# Patient Record
Sex: MALE | Race: WHITE | NOT HISPANIC OR LATINO | URBAN - METROPOLITAN AREA
[De-identification: names, ages, dates, MRNs, and addresses within clinical notes are randomized per-mention and may not be internally consistent; named-entity substitution may affect disease eponyms.]

---

## 2020-02-25 ENCOUNTER — OUTPATIENT (OUTPATIENT)
Dept: OUTPATIENT SERVICES | Facility: HOSPITAL | Age: 80
LOS: 1 days | End: 2020-02-25
Payer: MEDICARE

## 2020-02-25 DIAGNOSIS — R07.9 CHEST PAIN, UNSPECIFIED: ICD-10-CM

## 2020-02-25 PROCEDURE — 82962 GLUCOSE BLOOD TEST: CPT

## 2020-02-25 PROCEDURE — 93017 CV STRESS TEST TRACING ONLY: CPT

## 2020-02-25 PROCEDURE — 78452 HT MUSCLE IMAGE SPECT MULT: CPT

## 2020-02-25 PROCEDURE — 93018 CV STRESS TEST I&R ONLY: CPT

## 2020-02-25 PROCEDURE — A9500: CPT

## 2020-02-25 PROCEDURE — 78452 HT MUSCLE IMAGE SPECT MULT: CPT | Mod: 26

## 2020-02-25 PROCEDURE — 93016 CV STRESS TEST SUPVJ ONLY: CPT

## 2021-03-04 ENCOUNTER — APPOINTMENT (OUTPATIENT)
Dept: PULMONOLOGY | Facility: CLINIC | Age: 81
End: 2021-03-04
Payer: MEDICARE

## 2021-03-04 VITALS
SYSTOLIC BLOOD PRESSURE: 151 MMHG | TEMPERATURE: 96.2 F | WEIGHT: 168 LBS | HEIGHT: 72 IN | BODY MASS INDEX: 22.75 KG/M2 | OXYGEN SATURATION: 94 % | HEART RATE: 64 BPM | DIASTOLIC BLOOD PRESSURE: 91 MMHG

## 2021-03-04 DIAGNOSIS — Z87.891 PERSONAL HISTORY OF NICOTINE DEPENDENCE: ICD-10-CM

## 2021-03-04 DIAGNOSIS — Z78.9 OTHER SPECIFIED HEALTH STATUS: ICD-10-CM

## 2021-03-04 DIAGNOSIS — Z86.39 PERSONAL HISTORY OF OTHER ENDOCRINE, NUTRITIONAL AND METABOLIC DISEASE: ICD-10-CM

## 2021-03-04 PROBLEM — Z00.00 ENCOUNTER FOR PREVENTIVE HEALTH EXAMINATION: Status: ACTIVE | Noted: 2021-03-04

## 2021-03-04 PROCEDURE — 99204 OFFICE O/P NEW MOD 45 MIN: CPT

## 2021-03-04 RX ORDER — METFORMIN HYDROCHLORIDE 1000 MG/1
1000 TABLET, EXTENDED RELEASE ORAL
Refills: 0 | Status: ACTIVE | COMMUNITY

## 2021-03-04 RX ORDER — DULOXETINE HYDROCHLORIDE 20 MG/1
20 CAPSULE, DELAYED RELEASE ORAL
Refills: 0 | Status: ACTIVE | COMMUNITY

## 2021-03-04 RX ORDER — ATORVASTATIN CALCIUM 20 MG/1
20 TABLET, FILM COATED ORAL
Refills: 0 | Status: ACTIVE | COMMUNITY

## 2021-03-04 NOTE — PHYSICAL EXAM
[No Acute Distress] : no acute distress [Well Nourished] : well nourished [Normal Oropharynx] : normal oropharynx [II] : Mallampati Class: II [Normal Appearance] : normal appearance [No JVD] : no jvd [Normal Rate/Rhythm] : normal rate/rhythm [Normal S1, S2] : normal s1, s2 [No Resp Distress] : no resp distress [Clear to Auscultation Bilaterally] : clear to auscultation bilaterally [Benign] : benign [Not Tender] : not tender [Normal Gait] : normal gait [Gait - Sufficient For Exercise Testing] : gait sufficient for exercise testing [No Clubbing] : no clubbing [No Edema] : no edema [Normal Color/ Pigmentation] : normal color/ pigmentation [No Rash] : no rash [No Focal Deficits] : no focal deficits [No Sensory Deficits] : no sensory deficits [Oriented x3] : oriented x3 [Normal Affect] : normal affect

## 2021-03-04 NOTE — HISTORY OF PRESENT ILLNESS
[TextBox_4] : 80 year old male , ex-smoker (quit smoking 20 years back) with h/o chronic back surgery (planned for back surgery) was referred to Pulmonary clinic by Dr. Small for abnormal CT chest before Preop clearance before back surgery.\par Patient is c/o cough for last 3 weeks. Cough is associated with occasional phlegm. Patient is denying fever, chest pain or shortness of breath. CXR was done which showed opacity on right side. CT chest was done which showed RUL posterior segment consolidation. Patient denies coughing while eating.

## 2021-03-04 NOTE — DISCUSSION/SUMMARY
[FreeTextEntry1] : 80 year old male , ex-smoker (quit smoking 20 years back) with h/o chronic back surgery (planned for back surgery) was referred to Pulmonary clinic by Dr. Small for abnormal CT chest before Preop clearance before back surgery.\par \par Review:\par PCP note from Nov 2020\par CT chest (3/21): RUL posterior segment consolidation\par \par A/P\par Patient is c/o mild, predominantly dry cough for last 3 weeks but no other pulmonary symptoms. Plan to treat for aspiration pneumonia for 7 days. Barium swallow evaluation. Follow up in 1 week. Patient will bring CD of the CT chest during next visit. We will call PCP office for CBC report from yesterday. Plan for repeat CT chest in 8 weeks.

## 2021-03-04 NOTE — CONSULT LETTER
[Dear  ___] : Dear  [unfilled], [Consult Letter:] : I had the pleasure of evaluating your patient, [unfilled]. [Please see my note below.] : Please see my note below. [Consult Closing:] : Thank you very much for allowing me to participate in the care of this patient.  If you have any questions, please do not hesitate to contact me. [FreeTextEntry3] : Sincerely\par \par Tyron Cortez MD [facility]\par , [employer]\par Associate , Pulmonary and Critical Care Fellowship\par Pulmonary and Critical Care\par [facility]\par Phone: [phone]\par

## 2021-03-04 NOTE — REVIEW OF SYSTEMS
[Fever] : no fever [Chills] : no chills [Dry Eyes] : no dry eyes [Cough] : cough [Sputum] : no sputum [Dyspnea] : no dyspnea [SOB on Exertion] : no sob on exertion [Chest Discomfort] : no chest discomfort [Orthopnea] : no orthopnea [Nasal Discharge] : no nasal discharge [TextBox_148] : rest of ROS negative except in HPI

## 2021-03-12 ENCOUNTER — APPOINTMENT (OUTPATIENT)
Dept: PULMONOLOGY | Facility: CLINIC | Age: 81
End: 2021-03-12
Payer: MEDICARE

## 2021-03-12 ENCOUNTER — RESULT REVIEW (OUTPATIENT)
Age: 81
End: 2021-03-12

## 2021-03-12 VITALS
TEMPERATURE: 97.3 F | RESPIRATION RATE: 12 BRPM | HEART RATE: 70 BPM | BODY MASS INDEX: 22.35 KG/M2 | DIASTOLIC BLOOD PRESSURE: 80 MMHG | WEIGHT: 165 LBS | SYSTOLIC BLOOD PRESSURE: 145 MMHG | OXYGEN SATURATION: 96 % | HEIGHT: 72 IN

## 2021-03-12 DIAGNOSIS — K21.9 GASTRO-ESOPHAGEAL REFLUX DISEASE W/OUT ESOPHAGITIS: ICD-10-CM

## 2021-03-12 DIAGNOSIS — Z01.811 ENCOUNTER FOR PREPROCEDURAL RESPIRATORY EXAMINATION: ICD-10-CM

## 2021-03-12 PROCEDURE — 99214 OFFICE O/P EST MOD 30 MIN: CPT

## 2021-03-12 RX ORDER — PANTOPRAZOLE 40 MG/1
40 TABLET, DELAYED RELEASE ORAL DAILY
Qty: 45 | Refills: 1 | Status: ACTIVE | COMMUNITY
Start: 2021-03-12 | End: 1900-01-01

## 2021-03-12 RX ORDER — AMOXICILLIN AND CLAVULANATE POTASSIUM 875; 125 MG/1; MG/1
875-125 TABLET, COATED ORAL TWICE DAILY
Qty: 14 | Refills: 0 | Status: DISCONTINUED | COMMUNITY
Start: 2021-03-04 | End: 2021-03-12

## 2021-03-12 NOTE — CONSULT LETTER
[Dear  ___] : Dear  [unfilled], [Consult Letter:] : I had the pleasure of evaluating your patient, [unfilled]. [Please see my note below.] : Please see my note below. [Consult Closing:] : Thank you very much for allowing me to participate in the care of this patient.  If you have any questions, please do not hesitate to contact me. [FreeTextEntry3] : Sincerely\par \par [facility]\par , [employer] [facility] [employer]\par Associate , Pulmonary and Critical Care Fellowship\par Pulmonary and Critical Care\par [facility]\par Phone: [phone]\par  [DrAram  ___] : Dr. AHN

## 2021-03-12 NOTE — DISCUSSION/SUMMARY
[FreeTextEntry1] : 80 year old male , ex-smoker (quit smoking 20 years back) with h/o chronic back surgery (planned for back surgery) was referred to Pulmonary clinic by Dr. Small for abnormal CT chest before Preop clearance before back surgery.\par \par Review:\par PCP note from Nov 2020\par CT chest (3/21): RUL posterior segment consolidation (images reviewed), dilated esophagus. \par Labs (from [facility]): WBC normal\par \par A/P\par (1) Preoperative clearance:\par Patient has good functional status. His mild cough is possibly due to GERD. RUL consolidation is most likely secondary to chronic aspiration (dilated esophagus). Treated for 1 week with antibiotic. No active infection at present.\par \par Patient may undergo back surgery. His ARISCAT score 35. She has intermediate risk (13.3%) of in-hospital post-op pulmonary complications (composite including respiratory failure, respiratory infection, pleural effusion, atelectasis, pneumothorax, bronchospasm, aspiration pneumonitis).\par \par (2) RUL consolidation:\par - S/p 1 week Augmentin\par - Esophagus dilated on CT chest. possible aspiration.\par Plan:\par - PPI for GERD\par - CT chest in 6-8 weeks \par - Barium swallow for aspiration.\par - Follow up after repeat CT chest

## 2021-03-12 NOTE — HISTORY OF PRESENT ILLNESS
[TextBox_4] : 80 year old male , ex-smoker (quit smoking 20 years back) with h/o chronic back surgery (planned for back surgery) was referred to Pulmonary clinic by Dr. Small for abnormal CT chest before Preop clearance before back surgery.\par Patient is c/o cough for last 3 weeks. Cough is associated with occasional phlegm. Patient is denying fever, chest pain or shortness of breath. CXR was done which showed opacity on right side. CT chest was done which showed RUL posterior segment consolidation. Patient denies coughing while eating. \par \par 3/12/21:\par Patient finished 1 week of antibiotic. Mild cough is present. Denying fever, chest pain, SOB. He brought CD of CT chest done on 3/3/21.\par \par Duy Small MD, Phone: [phone], Cell phone: [phone], Fax [phone]\par Malcolm Castro MD (orthopedic), Phone number: [phone], Fax: [phone]

## 2021-04-21 ENCOUNTER — APPOINTMENT (OUTPATIENT)
Dept: RADIOLOGY | Facility: HOSPITAL | Age: 81
End: 2021-04-21
Payer: MEDICARE

## 2021-04-21 ENCOUNTER — OUTPATIENT (OUTPATIENT)
Dept: OUTPATIENT SERVICES | Facility: HOSPITAL | Age: 81
LOS: 1 days | End: 2021-04-21
Payer: MEDICARE

## 2021-04-21 PROCEDURE — 74230 X-RAY XM SWLNG FUNCJ C+: CPT

## 2021-04-21 PROCEDURE — 92611 MOTION FLUOROSCOPY/SWALLOW: CPT | Mod: GN

## 2021-04-21 PROCEDURE — 74230 X-RAY XM SWLNG FUNCJ C+: CPT | Mod: 26

## 2021-04-26 ENCOUNTER — APPOINTMENT (OUTPATIENT)
Dept: RADIOLOGY | Facility: HOSPITAL | Age: 81
End: 2021-04-26

## 2021-04-26 ENCOUNTER — RESULT REVIEW (OUTPATIENT)
Age: 81
End: 2021-04-26

## 2021-04-26 ENCOUNTER — NON-APPOINTMENT (OUTPATIENT)
Age: 81
End: 2021-04-26

## 2021-04-30 ENCOUNTER — NON-APPOINTMENT (OUTPATIENT)
Age: 81
End: 2021-04-30

## 2021-04-30 ENCOUNTER — APPOINTMENT (OUTPATIENT)
Dept: PULMONOLOGY | Facility: CLINIC | Age: 81
End: 2021-04-30
Payer: MEDICARE

## 2021-04-30 PROCEDURE — 99443: CPT | Mod: 95

## 2021-04-30 NOTE — REVIEW OF SYSTEMS
[Fever] : no fever [Chills] : no chills [Dry Eyes] : no dry eyes [Cough] : no cough [Sputum] : no sputum [Dyspnea] : no dyspnea [SOB on Exertion] : no sob on exertion [Chest Discomfort] : no chest discomfort [Orthopnea] : no orthopnea [Nasal Discharge] : no nasal discharge [TextBox_148] : rest of ROS negative except in HPI

## 2021-04-30 NOTE — DISCUSSION/SUMMARY
[FreeTextEntry1] : 80 year old male , ex-smoker (quit smoking 20 years back) with h/o chronic back surgery (planned for back surgery) was referred to Pulmonary clinic by Dr. Small for abnormal CT chest before Preop clearance before back surgery.\par \par Review:\par PCP note from Nov 2020\par CT chest (3/21): RUL posterior segment consolidation (images reviewed), dilated esophagus. \par Labs (from [facility]): WBC normal\par \par A/P\par RUL consolidation with history of smoking:\par - S/p antibiotic. Clinically, improved.\par - Esophagus dilated on CT chest. Barium swallow: not suggestive of aspiration.\par Plan:\par - Follow up CT chest as RUL consolidation was round/oval in shape/. Hidden lung mass can not be ruled out.  \par - Follow up after repeat CT chest.

## 2021-04-30 NOTE — HISTORY OF PRESENT ILLNESS
[Home] : at home, [unfilled] , at the time of the visit. [Medical Office: ([facility])___] : at the medical office located in  [Verbal consent obtained from patient] : the patient, [unfilled] [TextBox_4] : 80 year old male , ex-smoker (quit smoking 20 years back) with h/o chronic back surgery (planned for back surgery) was referred to Pulmonary clinic by Dr. Small for abnormal CT chest before Preop clearance before back surgery.\par Patient is c/o cough for last 3 weeks. Cough is associated with occasional phlegm. Patient is denying fever, chest pain or shortness of breath. CXR was done which showed opacity on right side. CT chest was done which showed RUL posterior segment consolidation. Patient denies coughing while eating. \par \par 3/12/21:\par Patient finished 1 week of antibiotic. Mild cough is present. Denying fever, chest pain, SOB. He brought CD of CT chest done on 3/3/21.\par \par Duy Small MD, Phone: [phone], Cell phone: [phone], Fax [phone]\par Malcolm Castro MD (orthopedic), Phone number: [phone], Fax: [phone]\par \par 4/30/21:\par Barium swallow test was done. Patient wanted to know about reason to get CT chest again.

## 2021-05-01 ENCOUNTER — RESULT REVIEW (OUTPATIENT)
Age: 81
End: 2021-05-01

## 2021-05-01 ENCOUNTER — OUTPATIENT (OUTPATIENT)
Dept: OUTPATIENT SERVICES | Facility: HOSPITAL | Age: 81
LOS: 1 days | End: 2021-05-01

## 2021-05-01 ENCOUNTER — APPOINTMENT (OUTPATIENT)
Dept: CT IMAGING | Facility: CLINIC | Age: 81
End: 2021-05-01
Payer: MEDICARE

## 2021-05-01 PROCEDURE — G1004: CPT

## 2021-05-01 PROCEDURE — 71250 CT THORAX DX C-: CPT | Mod: 26,MG

## 2021-05-11 ENCOUNTER — APPOINTMENT (OUTPATIENT)
Dept: PULMONOLOGY | Facility: CLINIC | Age: 81
End: 2021-05-11
Payer: MEDICARE

## 2021-05-11 VITALS
OXYGEN SATURATION: 95 % | TEMPERATURE: 97.3 F | WEIGHT: 170 LBS | BODY MASS INDEX: 23.03 KG/M2 | DIASTOLIC BLOOD PRESSURE: 63 MMHG | HEART RATE: 59 BPM | SYSTOLIC BLOOD PRESSURE: 99 MMHG | HEIGHT: 72 IN

## 2021-05-11 DIAGNOSIS — J18.1 LOBAR PNEUMONIA, UNSPECIFIED ORGANISM: ICD-10-CM

## 2021-05-11 PROCEDURE — 99214 OFFICE O/P EST MOD 30 MIN: CPT

## 2021-05-11 NOTE — DISCUSSION/SUMMARY
[FreeTextEntry1] : 80 year old male , ex-smoker (quit smoking 20 years back) with h/o chronic back surgery (planned for back surgery) with persistent right upper lobe consolidation on CT chest.\par \par Review:\par PCP note from Nov 2020\par CT chest (3/21): RUL posterior segment consolidation (images reviewed), dilated esophagus. \par CT chest (5/21): Persistent RUL posterior segment opacity\par Barium swallow: normal\par Labs (from [facility]): WBC normal\par \par A/P\par RUL consolidation with history of smoking:\par - Persistent opacity on RUL even after 2 months.\par - No s/s pneumonia\par - D/c organizing pneumonia vs lung cancer\par - Preop labs were done\par - CT guide biopsy of lung\par - All questions were answered.\par - Follow up 3-5 days after biopsy/

## 2021-05-11 NOTE — HISTORY OF PRESENT ILLNESS
[TextBox_4] : 80 year old male , ex-smoker (quit smoking 20 years back) with h/o chronic back surgery (planned for back surgery) was referred to Pulmonary clinic by Dr. Small for abnormal CT chest before Preop clearance before back surgery.\par Patient is c/o cough for last 3 weeks. Cough is associated with occasional phlegm. Patient is denying fever, chest pain or shortness of breath. CXR was done which showed opacity on right side. CT chest was done which showed RUL posterior segment consolidation. Patient denies coughing while eating. \par \par 3/12/21:\par Patient finished 1 week of antibiotic. Mild cough is present. Denying fever, chest pain, SOB. He brought CD of CT chest done on 3/3/21.\par \par Duy Small MD, Phone: [phone], Cell phone: [phone], Fax [phone]\par Malcolm Castro MD (orthopedic), Phone number: [phone], Fax: [phone]\par \par 4/30/21:\par Barium swallow test was done. Patient wanted to know about reason to get CT chest again.\par \par 5/11/21:\par CT chest showed persistent lung consolidation on right upper lobe posterior segment. c/o mild cough. No fever or expectorant.

## 2021-05-12 LAB
ALBUMIN SERPL ELPH-MCNC: 4.3 G/DL
ALP BLD-CCNC: 122 U/L
ALT SERPL-CCNC: 22 U/L
ANION GAP SERPL CALC-SCNC: 11 MMOL/L
AST SERPL-CCNC: 23 U/L
BASOPHILS # BLD AUTO: 0.03 K/UL
BASOPHILS NFR BLD AUTO: 0.4 %
BILIRUB SERPL-MCNC: 1.1 MG/DL
BUN SERPL-MCNC: 11 MG/DL
CALCIUM SERPL-MCNC: 9.7 MG/DL
CHLORIDE SERPL-SCNC: 105 MMOL/L
CO2 SERPL-SCNC: 23 MMOL/L
CREAT SERPL-MCNC: 0.99 MG/DL
EOSINOPHIL # BLD AUTO: 0.18 K/UL
EOSINOPHIL NFR BLD AUTO: 2.2 %
GLUCOSE SERPL-MCNC: 151 MG/DL
HCT VFR BLD CALC: 50.8 %
HGB BLD-MCNC: 15.6 G/DL
IMM GRANULOCYTES NFR BLD AUTO: 0.2 %
LYMPHOCYTES # BLD AUTO: 1.96 K/UL
LYMPHOCYTES NFR BLD AUTO: 24 %
MAN DIFF?: NORMAL
MCHC RBC-ENTMCNC: 28.8 PG
MCHC RBC-ENTMCNC: 30.7 GM/DL
MCV RBC AUTO: 93.7 FL
MONOCYTES # BLD AUTO: 0.76 K/UL
MONOCYTES NFR BLD AUTO: 9.3 %
NEUTROPHILS # BLD AUTO: 5.2 K/UL
NEUTROPHILS NFR BLD AUTO: 63.9 %
PLATELET # BLD AUTO: 283 K/UL
POTASSIUM SERPL-SCNC: 4.8 MMOL/L
PROT SERPL-MCNC: 6.5 G/DL
RBC # BLD: 5.42 M/UL
RBC # FLD: 13.8 %
SODIUM SERPL-SCNC: 138 MMOL/L
WBC # FLD AUTO: 8.15 K/UL

## 2021-05-13 LAB
APTT BLD: 35 SEC
INR PPP: 1.02 RATIO
PT BLD: 12.1 SEC

## 2021-05-19 ENCOUNTER — APPOINTMENT (OUTPATIENT)
Dept: CT IMAGING | Facility: HOSPITAL | Age: 81
End: 2021-05-19
Payer: MEDICARE

## 2021-05-19 ENCOUNTER — OUTPATIENT (OUTPATIENT)
Dept: OUTPATIENT SERVICES | Facility: HOSPITAL | Age: 81
LOS: 1 days | End: 2021-05-19
Payer: MEDICARE

## 2021-05-19 ENCOUNTER — RESULT REVIEW (OUTPATIENT)
Age: 81
End: 2021-05-19

## 2021-05-19 ENCOUNTER — APPOINTMENT (OUTPATIENT)
Dept: INTERVENTIONAL RADIOLOGY/VASCULAR | Facility: HOSPITAL | Age: 81
End: 2021-05-19
Payer: MEDICARE

## 2021-05-19 LAB — GLUCOSE BLDC GLUCOMTR-MCNC: 123 MG/DL — HIGH (ref 70–99)

## 2021-05-19 PROCEDURE — 88305 TISSUE EXAM BY PATHOLOGIST: CPT | Mod: 26,59

## 2021-05-19 PROCEDURE — 88344 IMHCHEM/IMCYTCHM EA MLT ANTB: CPT | Mod: 26

## 2021-05-19 PROCEDURE — 71045 X-RAY EXAM CHEST 1 VIEW: CPT | Mod: 26

## 2021-05-19 PROCEDURE — 88341 IMHCHEM/IMCYTCHM EA ADD ANTB: CPT | Mod: 26,59

## 2021-05-19 PROCEDURE — 88173 CYTOPATH EVAL FNA REPORT: CPT

## 2021-05-19 PROCEDURE — 32408 CORE NDL BX LNG/MED PERQ: CPT

## 2021-05-19 PROCEDURE — 88305 TISSUE EXAM BY PATHOLOGIST: CPT

## 2021-05-19 PROCEDURE — 82962 GLUCOSE BLOOD TEST: CPT

## 2021-05-19 PROCEDURE — 88173 CYTOPATH EVAL FNA REPORT: CPT | Mod: 26

## 2021-05-19 PROCEDURE — 71045 X-RAY EXAM CHEST 1 VIEW: CPT

## 2021-05-19 PROCEDURE — 88342 IMHCHEM/IMCYTCHM 1ST ANTB: CPT | Mod: 26,59

## 2021-05-19 PROCEDURE — C1769: CPT

## 2021-05-19 PROCEDURE — 88344 IMHCHEM/IMCYTCHM EA MLT ANTB: CPT

## 2021-05-19 PROCEDURE — 88341 IMHCHEM/IMCYTCHM EA ADD ANTB: CPT

## 2021-05-20 ENCOUNTER — NON-APPOINTMENT (OUTPATIENT)
Age: 81
End: 2021-05-20

## 2021-05-20 LAB — NON-GYNECOLOGICAL CYTOLOGY STUDY: SIGNIFICANT CHANGE UP

## 2021-05-22 ENCOUNTER — OUTPATIENT (OUTPATIENT)
Dept: OUTPATIENT SERVICES | Facility: HOSPITAL | Age: 81
LOS: 1 days | End: 2021-05-22
Payer: MEDICARE

## 2021-05-22 LAB — GLUCOSE SERPL-MCNC: 139 MG/DL — HIGH (ref 70–99)

## 2021-05-22 PROCEDURE — 82947 ASSAY GLUCOSE BLOOD QUANT: CPT

## 2021-05-22 PROCEDURE — 78815 PET IMAGE W/CT SKULL-THIGH: CPT | Mod: 26,MH

## 2021-05-22 PROCEDURE — 36415 COLL VENOUS BLD VENIPUNCTURE: CPT

## 2021-05-22 PROCEDURE — A9552: CPT

## 2021-05-22 PROCEDURE — 78815 PET IMAGE W/CT SKULL-THIGH: CPT

## 2021-05-24 ENCOUNTER — NON-APPOINTMENT (OUTPATIENT)
Age: 81
End: 2021-05-24

## 2021-05-27 ENCOUNTER — APPOINTMENT (OUTPATIENT)
Dept: PULMONOLOGY | Facility: CLINIC | Age: 81
End: 2021-05-27

## 2021-06-01 ENCOUNTER — APPOINTMENT (OUTPATIENT)
Dept: PULMONOLOGY | Facility: CLINIC | Age: 81
End: 2021-06-01
Payer: MEDICARE

## 2021-06-01 VITALS
HEART RATE: 67 BPM | TEMPERATURE: 97.3 F | SYSTOLIC BLOOD PRESSURE: 130 MMHG | OXYGEN SATURATION: 94 % | WEIGHT: 175 LBS | BODY MASS INDEX: 23.7 KG/M2 | DIASTOLIC BLOOD PRESSURE: 74 MMHG | HEIGHT: 72 IN

## 2021-06-01 LAB — SARS-COV-2 N GENE NPH QL NAA+PROBE: NOT DETECTED

## 2021-06-01 PROCEDURE — 94060 EVALUATION OF WHEEZING: CPT

## 2021-06-01 PROCEDURE — 94618 PULMONARY STRESS TESTING: CPT

## 2021-06-01 PROCEDURE — 94729 DIFFUSING CAPACITY: CPT

## 2021-06-01 PROCEDURE — 99214 OFFICE O/P EST MOD 30 MIN: CPT | Mod: 25

## 2021-06-01 PROCEDURE — 94726 PLETHYSMOGRAPHY LUNG VOLUMES: CPT

## 2021-06-01 RX ORDER — DOXYCYCLINE HYCLATE 100 MG/1
100 CAPSULE ORAL
Qty: 28 | Refills: 0 | Status: COMPLETED | COMMUNITY
Start: 2021-05-30

## 2021-06-01 NOTE — PHYSICAL EXAM
[No Acute Distress] : no acute distress [Well Nourished] : well nourished [Normal Oropharynx] : normal oropharynx [II] : Mallampati Class: II [Normal Appearance] : normal appearance [Supple] : supple [No JVD] : no jvd [Normal Rate/Rhythm] : normal rate/rhythm [Normal S1, S2] : normal s1, s2 [No Resp Distress] : no resp distress [No Acc Muscle Use] : no acc muscle use [Clear to Auscultation Bilaterally] : clear to auscultation bilaterally [Benign] : benign [Not Tender] : not tender [Normal Gait] : normal gait [Gait - Sufficient For Exercise Testing] : gait sufficient for exercise testing [No Clubbing] : no clubbing [No Edema] : no edema [Normal Color/ Pigmentation] : normal color/ pigmentation [No Rash] : no rash [No Focal Deficits] : no focal deficits [No Sensory Deficits] : no sensory deficits [Oriented x3] : oriented x3 [Normal Affect] : normal affect

## 2021-06-01 NOTE — HISTORY OF PRESENT ILLNESS
[TextBox_4] : 80 year old male , ex-smoker (quit smoking 20 years back) with h/o chronic back surgery (planned for back surgery) was referred to Pulmonary clinic by Dr. Small for abnormal CT chest before Preop clearance before back surgery.\par Patient is c/o cough for last 3 weeks. Cough is associated with occasional phlegm. Patient is denying fever, chest pain or shortness of breath. CXR was done which showed opacity on right side. CT chest was done which showed RUL posterior segment consolidation. Patient denies coughing while eating. \par \par 3/12/21:\par Patient finished 1 week of antibiotic. Mild cough is present. Denying fever, chest pain, SOB. He brought CD of CT chest done on 3/3/21.\par \par 4/30/21:\par Barium swallow test was done. Patient wanted to know about reason to get CT chest again.\par \par 5/11/21:\par CT chest showed persistent lung consolidation on right upper lobe posterior segment. c/o mild cough. No fever or expectorant. 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 PFT was done today. Patient is denying any pulmonary complaint. \par \par Oncologist Giuliano Patino MD (fax number [phone], phone number [phone])\par Duy Small MD, Phone: [phone], Cell phone: [phone], Fax [phone]. \par Malcolm Castro MD (orthopedic), Phone number: [phone], Fax: [phone]

## 2021-06-01 NOTE — DISCUSSION/SUMMARY
[FreeTextEntry1] : 80 year old male , ex-smoker (quit smoking 20 years back) with h/o chronic back surgery s/p back surgery with Lung adenocarcinoma.\par \par Review:\par PCP note from Nov 2020\par CT chest (3/21): RUL posterior segment consolidation (images reviewed), dilated esophagus. \par CT chest (5/21): Persistent RUL posterior segment opacity, peripheral, RUL 6.2 cm\par PET scan: PET positive RUL peripheral opacity (SUV 6.9) with RUL 0.7 cm lung nodule, mildly PET avid (SUV 3)\par PFT (6/1/21): FEV1 89, FEV1/FVC 65, , DLCO 61\par 6 minute walking test: Oxygen saturation remained at 95% on RA with exertion and at rest Patient walked 304.8 meters\par Barium swallow: normal\par Labs (from [facility]): WBC normal\par Pathology: CT guided biopsy showed: Adenocarcinoma with granulation surrounding tissue\par \par A/P\par Lung adenocarcinoma:\par - Good function status. FEV1 89%, FEV1/FVC 65, , DLCO 61. Predictive post resection DLCO around 50%\par - PET positive for peripheral RUL lung mass (6.2 cm) with mildly PET avid RUL nodule 7 mm. No mediastinal lymphadenopathy\par - MRI brain (scheduled for tomorrow)\par - Patient was referred today to Dr. Bonner\par - Will discuss with Dr. Bonner regarding EBUs with TBNA for staging before surgical resection\par - Patient also wants an opinion from Dr. Giuliano Patino (oncologist at [facility]) before making final decision. I will send my note from today, Pathology report and PFT results to his office. As per wife, he has all other records.\par - I called Dr. Small and discuss plan. He agrees with the management.\par \par .\par

## 2021-06-01 NOTE — REVIEW OF SYSTEMS
[Back Pain] : back pain [Fever] : no fever [Chills] : no chills [Dry Eyes] : no dry eyes [Cough] : no cough [Sputum] : no sputum [Dyspnea] : no dyspnea [SOB on Exertion] : no sob on exertion [Chest Discomfort] : no chest discomfort [Orthopnea] : no orthopnea [Nasal Discharge] : no nasal discharge [TextBox_148] : rest of ROS negative except in HPI

## 2021-06-01 NOTE — CONSULT LETTER
[Dear  ___] : Dear  [unfilled], [Consult Letter:] : I had the pleasure of evaluating your patient, [unfilled]. [Please see my note below.] : Please see my note below. [Consult Closing:] : Thank you very much for allowing me to participate in the care of this patient.  If you have any questions, please do not hesitate to contact me. [FreeTextEntry3] : Sincerely\par \par Tyron Cortez MD [facility]\par , [facility] [employer]\par Associate , Pulmonary and Critical Care Fellowship\par Pulmonary and Critical Care\par [facility]\par Phone: [phone]\par  [DrAram  ___] : Dr. AHN

## 2021-06-02 ENCOUNTER — APPOINTMENT (OUTPATIENT)
Dept: MRI IMAGING | Facility: HOSPITAL | Age: 81
End: 2021-06-02
Payer: MEDICARE

## 2021-06-02 ENCOUNTER — RESULT REVIEW (OUTPATIENT)
Age: 81
End: 2021-06-02

## 2021-06-02 ENCOUNTER — OUTPATIENT (OUTPATIENT)
Dept: OUTPATIENT SERVICES | Facility: HOSPITAL | Age: 81
LOS: 1 days | End: 2021-06-02
Payer: MEDICARE

## 2021-06-02 ENCOUNTER — APPOINTMENT (OUTPATIENT)
Dept: THORACIC SURGERY | Facility: CLINIC | Age: 81
End: 2021-06-02
Payer: MEDICARE

## 2021-06-02 DIAGNOSIS — Z11.59 ENCOUNTER FOR SCREENING FOR OTHER VIRAL DISEASES: ICD-10-CM

## 2021-06-02 DIAGNOSIS — C34.11 MALIGNANT NEOPLASM OF UPPER LOBE, RIGHT BRONCHUS OR LUNG: ICD-10-CM

## 2021-06-02 PROCEDURE — 70553 MRI BRAIN STEM W/O & W/DYE: CPT

## 2021-06-02 PROCEDURE — 99205 OFFICE O/P NEW HI 60 MIN: CPT

## 2021-06-02 PROCEDURE — 70553 MRI BRAIN STEM W/O & W/DYE: CPT | Mod: 26,MH

## 2021-06-02 PROCEDURE — A9585: CPT

## 2021-06-02 NOTE — REVIEW OF SYSTEMS
[Skin Wound] : skin wound [Negative] : Musculoskeletal [FreeTextEntry9] : uses a cane  [de-identified] : laminectomy wound, healing and facial MOH's procedure on the right

## 2021-06-02 NOTE — ASSESSMENT
[FreeTextEntry1] : 81 y/o male, former smoker (quit 20 years ago) with a PMH of chronic back pain. Found to have a suspicious lung lesion in RUL on pre-operative chest x-ray prior to back surgery. CT scan follow up and antibiotic treatment for presumed pneumonia. Consolidation did no resolve on follow up CT scan. Lesion was biopsied and was found to be adenocarcinoma. He recently underwent a laminectomy on 4/1/21 at [facility] and MOH's procedure. He presents today for surgical evaluation. He is referred by Dr. Tyron Cortez.  \par \par Patient denies cough, hemoptysis, shortness of breath, weight change, nausea, vomiting, headache, diarrhea, chest pain, or fever. He admits to recently undergoing a laminectomy at [facility] on 4/1/21 and developed an infected incision that is healing while using antibiotics. He also underwent right facial MOH's surgery. He denies previous pneumonia, empyema or thoracic surgeries in the past.\par \par CT imaging and PET imaging was reviewed with the patient and his wife in detail. I explained that based on the size of the mass in the right upper lobe (6.2 cm) his clinical stage is a  T3N0M0, Stage IIB lung cancer. There is no evidence of extrathoracic disease noted on the PET CT. There is a small lesion in the RUL that is subcentimeter. This may be benign, a separate primary or a satellite nodule, which would still make him a T3N0M0. MRI brain completed today, preliminary report was negative for metastatic disease. According to NCCN guidelines, surgical resection is advised. \par \par Due to the size of the mass, I recommended invasive mediastinal staging despite a negative PET scan to complete his work up for upfront surgery. Will refer to Dr. Ravi to undergo an EBUS for mediastinal staging. \par \par I explained that if the lymph nodes are positive for malignancy then chemotherapy will be recommended prior to surgical resection. I explained that if the lymph nodes are negative, I am recommending surgical resection. Surgical resection would entail, Bronchoscopy, RIGHT VATS robotic assisted right upper lobectomy, possible superior segmentectomy of the Right lower lobe with lymph node dissection. The patient was counseled on the risks, benefits and alternatives of proceeding with lung resection. Specifically, the risk of bleeding, need for a blood transfusion, DVT, pulmonary embolism, pneumonia, postoperative respiratory insufficiency, postoperative ventilator support, as well as prolonged air leak, need for chest drainage, dysrhythmia, myocardial infarction, postoperative pain syndrome, need for adjuvant therapy, risk of recurrence, need for surveillance, conversion to an open procedure, as well as mortality was discussed with the patient who understands and agrees to the above. Final surgical pathology will determine the need for adjuvant therapy. \par \par The patient has had PFTs and he can tolerate a right upper lobectomy and possible superior segmentectomy. HE will need cardiac clearance. \par \par Plan:\par 1. EBUS with Dr. Ravi\par 2. If LN are negative, plan for surgical resection \par \par I, FCO HERNANDEZ , am scribing for and in the presence of [Dr. Iván Bonner ] the following sections: History of present illness, past Medical/family/surgical/family/social history, review of systems, vital signs, physical exam and disposition.\par  \par \par \par \par \par \par \par \par \par

## 2021-06-02 NOTE — CONSULT LETTER
[Dear  ___] : Dear  [unfilled], [Please see my note below.] : Please see my note below. [Consult Closing:] : Thank you very much for allowing me to participate in the care of this patient.  If you have any questions, please do not hesitate to contact me. [Sincerely,] : Sincerely, [FreeTextEntry3] : Dr. Iván Bonner

## 2021-06-02 NOTE — PHYSICAL EXAM
[General Appearance - Alert] : alert [General Appearance - Well Nourished] : well nourished [] : no respiratory distress [Respiration, Rhythm And Depth] : normal respiratory rhythm and effort [Exaggerated Use Of Accessory Muscles For Inspiration] : no accessory muscle use [Auscultation Breath Sounds / Voice Sounds] : lungs were clear to auscultation bilaterally [Apical Impulse] : the apical impulse was normal [Heart Rate And Rhythm] : heart rate was normal and rhythm regular [Heart Sounds] : normal S1 and S2 [Examination Of The Chest] : the chest was normal in appearance [2+] : left 2+ [Abnormal Walk] : normal gait [Oriented To Time, Place, And Person] : oriented to person, place, and time

## 2021-06-02 NOTE — HISTORY OF PRESENT ILLNESS
[FreeTextEntry1] : 81 y/o male, former smoker (quit 20 years ago) with a PMH of chronic back pain and pre-op to back surgery was referred to Pulmonary for an abnormal CT chest and found to have a persistent lung consolidation in the right upper lobe despite abx use. He underwent a CT guided biopsy of the RUL nodule which revealed Adenocarcinoma. He recently underwent a laminectomy on 4/1/21 at [facility] and MOH's procedure. He presents today for surgical evaluation. He is referred by Dr. Tyron Cortez.  \par \par CT chest completed on 05/01/21:\par -persistent region of dense peripherally based masslike consolidation within the posterior right upper lobe with surrounding regions of bronchiectasis; persistence suggest possibility of organizing pneumonitis.\par \par PET CT completed on 05/24/21:\par -FDG-avid right upper lobe consolidative opacity, consistent with known malignancy\par -0.7cm FDG-avid nodule in the right upper lobe\par -no FDG-avid mediastinal or hilar lymphadenopathy. No evidence of distant metastasis\par -Focal FDG avidity in the prostate, nonspecific.\par \par PFT done on 06/01/21 showed FEV1 = 2.84, 91% of predicted value, FVC = 4.30, 101% of predicted value, PEF = 7.66, 90% of predicted value and DLCO = 15.2, 61% of predicted value.\par  \par MRI brain completed on 06/02/21: prelim negative

## 2021-06-03 ENCOUNTER — APPOINTMENT (OUTPATIENT)
Dept: PULMONOLOGY | Facility: CLINIC | Age: 81
End: 2021-06-03
Payer: MEDICARE

## 2021-06-03 PROCEDURE — 99443: CPT | Mod: 95

## 2021-06-03 NOTE — REVIEW OF SYSTEMS
[Fever] : no fever [Chills] : no chills [Dry Eyes] : no dry eyes [Cough] : no cough [Sputum] : no sputum [Dyspnea] : no dyspnea [SOB on Exertion] : no sob on exertion [Chest Discomfort] : no chest discomfort [Orthopnea] : no orthopnea [Nasal Discharge] : no nasal discharge [Back Pain] : back pain [TextBox_148] : rest of ROS negative except in HPI

## 2021-06-03 NOTE — DISCUSSION/SUMMARY
[FreeTextEntry1] : 80 year old male , ex-smoker (quit smoking 20 years back) with h/o chronic back surgery s/p back surgery with Lung adenocarcinoma.\par \par Review:\par PCP note from Nov 2020\par CT chest (3/21): RUL posterior segment consolidation (images reviewed), dilated esophagus. \par CT chest (5/21): Persistent RUL posterior segment opacity, peripheral, RUL 6.2 cm\par PET scan: PET positive RUL peripheral opacity (SUV 6.9) with RUL 0.7 cm lung nodule, mildly PET avid (SUV 3)\par MRI Brain: No metastasis\par PFT (6/1/21): FEV1 89, FEV1/FVC 65, , DLCO 61\par 6 minute walking test: Oxygen saturation remained at 95% on RA with exertion and at rest Patient walked 304.8 meters\par Barium swallow: normal\par Labs (from [facility]): WBC normal\par Pathology: CT guided biopsy showed: Adenocarcinoma with granulation surrounding tissue\par \par A/P\par Lung adenocarcinoma:\par - Good function status. FEV1 89%, FEV1/FVC 65, , DLCO 61. Predictive post resection DLCO around 50%\par - PET positive for peripheral RUL lung mass (6.2 cm) with mildly PET avid RUL nodule 7 mm. No mediastinal lymphadenopathy\par - MRI brain: No metastasis\par - EBUs with TBNA planned for 6/8/21\par - Patient has seen Dr. Bonner. They will make final decision after meeting oncologist at [facility]..\par \par .\par

## 2021-06-03 NOTE — HISTORY OF PRESENT ILLNESS
[Home] : at home, [unfilled] , at the time of the visit. [Medical Office: ([facility])___] : at the medical office located in  [Verbal consent obtained from patient] : the patient, [unfilled] [TextBox_4] : 80 year old male , ex-smoker (quit smoking 20 years back) with h/o chronic back surgery (planned for back surgery) was referred to Pulmonary clinic by Dr. Small for abnormal CT chest before Preop clearance before back surgery.\par Patient is c/o cough for last 3 weeks. Cough is associated with occasional phlegm. Patient is denying fever, chest pain or shortness of breath. CXR was done which showed opacity on right side. CT chest was done which showed RUL posterior segment consolidation. Patient denies coughing while eating. \par \par 3/12/21:\par Patient finished 1 week of antibiotic. Mild cough is present. Denying fever, chest pain, SOB. He brought CD of CT chest done on 3/3/21.\par \par 4/30/21:\par Barium swallow test was done. Patient wanted to know about reason to get CT chest again.\par \par 5/11/21:\par CT chest showed persistent lung consolidation on right upper lobe posterior segment. c/o mild cough. No fever or expectorant. 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 PFT was done today. Patient is denying any pulmonary complaint. \par \par 6/3/21:\par Patient has met with Dr. Bonner. MRI brain was done. Wife has questions about management. \par \par Oncologist Giuliano Patino MD (fax number [phone], phone number [phone])\par Duy Small MD, Phone: [phone], Cell phone: [phone], Fax [phone]. \par Malcolm Castro MD (orthopedic), Phone number: [phone], Fax: [phone]

## 2021-06-07 ENCOUNTER — NON-APPOINTMENT (OUTPATIENT)
Age: 81
End: 2021-06-07

## 2021-06-07 LAB — SARS-COV-2 N GENE NPH QL NAA+PROBE: NOT DETECTED

## 2021-06-08 ENCOUNTER — NON-APPOINTMENT (OUTPATIENT)
Age: 81
End: 2021-06-08

## 2021-06-08 ENCOUNTER — APPOINTMENT (OUTPATIENT)
Dept: PULMONOLOGY | Facility: CLINIC | Age: 81
End: 2021-06-08
Payer: MEDICARE

## 2021-06-08 ENCOUNTER — APPOINTMENT (OUTPATIENT)
Dept: PULMONOLOGY | Facility: HOSPITAL | Age: 81
End: 2021-06-08

## 2021-06-08 DIAGNOSIS — C34.90 MALIGNANT NEOPLASM OF UNSPECIFIED PART OF UNSPECIFIED BRONCHUS OR LUNG: ICD-10-CM

## 2021-06-08 PROCEDURE — 99443: CPT | Mod: 95

## 2021-06-08 NOTE — DISCUSSION/SUMMARY
[FreeTextEntry1] : 80 year old male , ex-smoker (quit smoking 20 years back) with h/o chronic back surgery s/p back surgery with Lung adenocarcinoma.\par \par Review:\par PCP note from Nov 2020\par CT chest (3/21): RUL posterior segment consolidation (images reviewed), dilated esophagus. \par CT chest (5/21): Persistent RUL posterior segment opacity, peripheral, RUL 6.2 cm\par PET scan: PET positive RUL peripheral opacity (SUV 6.9) with RUL 0.7 cm lung nodule, mildly PET avid (SUV 3)\par MRI Brain: No metastasis\par PFT (6/1/21): FEV1 89, FEV1/FVC 65, , DLCO 61\par 6 minute walking test: Oxygen saturation remained at 95% on RA with exertion and at rest Patient walked 304.8 meters\par Barium swallow: normal\par Labs (from [facility]): WBC normal\par Pathology: CT guided biopsy showed: Adenocarcinoma with granulation surrounding tissue\par \par A/P\par Lung adenocarcinoma:\par - Good function status. FEV1 89%, FEV1/FVC 65, , DLCO 61. Predictive post resection DLCO around 50%\par - PET positive for peripheral RUL lung mass (6.2 cm) with mildly PET avid RUL nodule 7 mm. No mediastinal lymphadenopathy\par - MRI brain: No metastasis. small vessel ischemic and lacunar changes.\par - EBUs with [facility] planned but patient decided to cancel it and go [place] [facility] [place]par - Patient will see neurologist in future.\par \par .\par

## 2021-06-08 NOTE — PHYSICAL EXAM
[No Acute Distress] : no acute distress [Well Nourished] : well nourished [Normal Oropharynx] : normal oropharynx [II] : Mallampati Class: II [Normal Appearance] : normal appearance [Supple] : supple [No JVD] : no jvd [Normal Rate/Rhythm] : normal rate/rhythm [Normal S1, S2] : normal s1, s2 [No Resp Distress] : no resp distress [No Acc Muscle Use] : no acc muscle use [Clear to Auscultation Bilaterally] : clear to auscultation bilaterally [Benign] : benign [Not Tender] : not tender [Normal Gait] : normal gait [Gait - Sufficient For Exercise Testing] : gait sufficient for exercise testing [No Clubbing] : no clubbing [No Cyanosis] : no cyanosis [No Edema] : no edema [Normal Color/ Pigmentation] : normal color/ pigmentation [No Rash] : no rash [No Focal Deficits] : no focal deficits [No Sensory Deficits] : no sensory deficits [Oriented x3] : oriented x3 [Normal Affect] : normal affect

## 2021-06-08 NOTE — HISTORY OF PRESENT ILLNESS
[TextBox_4] : 80 year old male , ex-smoker (quit smoking 20 years back) with h/o chronic back surgery (planned for back surgery) was referred to Pulmonary clinic by Dr. Small for abnormal CT chest before Preop clearance before back surgery.\par Patient is c/o cough for last 3 weeks. Cough is associated with occasional phlegm. Patient is denying fever, chest pain or shortness of breath. CXR was done which showed opacity on right side. CT chest was done which showed RUL posterior segment consolidation. Patient denies coughing while eating. \par \par 3/12/21:\par Patient finished 1 week of antibiotic. Mild cough is present. Denying fever, chest pain, SOB. He brought CD of CT chest done on 3/3/21.\par \par 4/30/21:\par Barium swallow test was done. Patient wanted to know about reason to get CT chest again.\par \par 5/11/21:\par CT chest showed persistent lung consolidation on right upper lobe posterior segment. c/o mild cough. No fever or expectorant. 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 PFT was done today. Patient is denying any pulmonary complaint. \par \par 6/3/21:\par Patient has met with Dr. Bonner. MRI brain was done. Wife has questions about management. \par \par 6/8/21:\par Patient decided to get surgery done at [facility] [place]. Patient canceled planned EBUS today with Dr. Ravi. Wife called today to discuss about the results of MRI brain again. \par \par Oncologist Giuliano Patino MD (fax number [phone], phone number [phone])\par Duy Small MD, Phone: [phone], Cell phone: [phone], Fax [phone]. \par Malcolm Castro MD (orthopedic), Phone number: [phone], Fax: [phone]

## 2022-05-27 ENCOUNTER — APPOINTMENT (OUTPATIENT)
Dept: NEUROLOGY | Facility: CLINIC | Age: 82
End: 2022-05-27

## 2022-10-14 ENCOUNTER — APPOINTMENT (OUTPATIENT)
Age: 82
End: 2022-10-14

## 2023-06-28 NOTE — END OF VISIT
[Time Spent: ___ minutes] : I have spent [unfilled] minutes of time on the encounter. Detail Level: Detailed Detail Level: Simple